# Patient Record
Sex: MALE | URBAN - METROPOLITAN AREA
[De-identification: names, ages, dates, MRNs, and addresses within clinical notes are randomized per-mention and may not be internally consistent; named-entity substitution may affect disease eponyms.]

---

## 2018-07-05 PROBLEM — Z00.00 ENCOUNTER FOR PREVENTIVE HEALTH EXAMINATION: Status: ACTIVE | Noted: 2018-07-05

## 2018-07-12 ENCOUNTER — OUTPATIENT (OUTPATIENT)
Dept: OUTPATIENT SERVICES | Facility: HOSPITAL | Age: 38
LOS: 1 days | Discharge: HOME | End: 2018-07-12
Payer: COMMERCIAL

## 2018-07-12 VITALS
OXYGEN SATURATION: 98 % | HEIGHT: 73 IN | DIASTOLIC BLOOD PRESSURE: 69 MMHG | HEART RATE: 96 BPM | SYSTOLIC BLOOD PRESSURE: 138 MMHG | TEMPERATURE: 97 F | WEIGHT: 315 LBS | RESPIRATION RATE: 20 BRPM

## 2018-07-12 DIAGNOSIS — Z98.890 OTHER SPECIFIED POSTPROCEDURAL STATES: Chronic | ICD-10-CM

## 2018-07-12 DIAGNOSIS — E66.01 MORBID (SEVERE) OBESITY DUE TO EXCESS CALORIES: ICD-10-CM

## 2018-07-12 LAB
ABO RH CONFIRMATION: SIGNIFICANT CHANGE UP
ALBUMIN SERPL ELPH-MCNC: 4.1 G/DL — SIGNIFICANT CHANGE UP (ref 3.3–5)
ANION GAP SERPL CALC-SCNC: 11 MMOL/L — SIGNIFICANT CHANGE UP (ref 5–17)
APPEARANCE UR: CLEAR — SIGNIFICANT CHANGE UP
APTT BLD: 38.7 SEC — HIGH (ref 27.5–37.4)
BASOPHILS # BLD AUTO: 0.05 K/UL — SIGNIFICANT CHANGE UP (ref 0–0.2)
BASOPHILS NFR BLD AUTO: 0.6 % — SIGNIFICANT CHANGE UP (ref 0–2)
BILIRUB UR-MCNC: NEGATIVE — SIGNIFICANT CHANGE UP
BLD GP AB SCN SERPL QL: SIGNIFICANT CHANGE UP
BLOOD GAS SOURCE: SIGNIFICANT CHANGE UP
BUN SERPL-MCNC: 16 MG/DL — SIGNIFICANT CHANGE UP (ref 7–23)
CALCIUM SERPL-MCNC: 9 MG/DL — SIGNIFICANT CHANGE UP (ref 8.5–10.1)
CHLORIDE SERPL-SCNC: 100 MMOL/L — SIGNIFICANT CHANGE UP (ref 96–108)
CO2 SERPL-SCNC: 24 MMOL/L — SIGNIFICANT CHANGE UP (ref 22–31)
COHGB MFR BLDV: 2.7 % — HIGH (ref 0–1.5)
COLOR SPEC: YELLOW — SIGNIFICANT CHANGE UP
CREAT SERPL-MCNC: 0.88 MG/DL — SIGNIFICANT CHANGE UP (ref 0.5–1.3)
DIFF PNL FLD: NEGATIVE — SIGNIFICANT CHANGE UP
EOSINOPHIL # BLD AUTO: 0.11 K/UL — SIGNIFICANT CHANGE UP (ref 0–0.5)
EOSINOPHIL NFR BLD AUTO: 1.2 % — SIGNIFICANT CHANGE UP (ref 0–6)
EPI CELLS # UR: SIGNIFICANT CHANGE UP
GLUCOSE SERPL-MCNC: 119 MG/DL — HIGH (ref 70–99)
GLUCOSE UR QL: NEGATIVE MG/DL — SIGNIFICANT CHANGE UP
HCT VFR BLD CALC: 45.7 % — SIGNIFICANT CHANGE UP (ref 39–50)
HGB BLD-MCNC: 15.7 G/DL — SIGNIFICANT CHANGE UP (ref 13–17)
HYALINE CASTS # UR AUTO: ABNORMAL /LPF
IMM GRANULOCYTES NFR BLD AUTO: 0.4 % — SIGNIFICANT CHANGE UP (ref 0–1.5)
INR BLD: 1.16 RATIO — SIGNIFICANT CHANGE UP (ref 0.88–1.16)
KETONES UR-MCNC: ABNORMAL
LEUKOCYTE ESTERASE UR-ACNC: NEGATIVE — SIGNIFICANT CHANGE UP
LYMPHOCYTES # BLD AUTO: 2.28 K/UL — SIGNIFICANT CHANGE UP (ref 1–3.3)
LYMPHOCYTES # BLD AUTO: 25.4 % — SIGNIFICANT CHANGE UP (ref 13–44)
MCHC RBC-ENTMCNC: 30.4 PG — SIGNIFICANT CHANGE UP (ref 27–34)
MCHC RBC-ENTMCNC: 34.4 GM/DL — SIGNIFICANT CHANGE UP (ref 32–36)
MCV RBC AUTO: 88.6 FL — SIGNIFICANT CHANGE UP (ref 80–100)
MONOCYTES # BLD AUTO: 0.67 K/UL — SIGNIFICANT CHANGE UP (ref 0–0.9)
MONOCYTES NFR BLD AUTO: 7.5 % — SIGNIFICANT CHANGE UP (ref 2–14)
NEUTROPHILS # BLD AUTO: 5.84 K/UL — SIGNIFICANT CHANGE UP (ref 1.8–7.4)
NEUTROPHILS NFR BLD AUTO: 64.9 % — SIGNIFICANT CHANGE UP (ref 43–77)
NITRITE UR-MCNC: NEGATIVE — SIGNIFICANT CHANGE UP
NRBC # BLD: 0 /100 WBCS — SIGNIFICANT CHANGE UP (ref 0–0)
PH UR: 5 — SIGNIFICANT CHANGE UP (ref 5–8)
PLATELET # BLD AUTO: 243 K/UL — SIGNIFICANT CHANGE UP (ref 150–400)
POTASSIUM SERPL-MCNC: 3.8 MMOL/L — SIGNIFICANT CHANGE UP (ref 3.5–5.3)
POTASSIUM SERPL-SCNC: 3.8 MMOL/L — SIGNIFICANT CHANGE UP (ref 3.5–5.3)
PROT UR-MCNC: 15 MG/DL
PROTHROM AB SERPL-ACNC: 12.6 SEC — SIGNIFICANT CHANGE UP (ref 9.8–12.7)
RBC # BLD: 5.16 M/UL — SIGNIFICANT CHANGE UP (ref 4.2–5.8)
RBC # FLD: 12.5 % — SIGNIFICANT CHANGE UP (ref 10.3–14.5)
RBC CASTS # UR COMP ASSIST: NEGATIVE /HPF — SIGNIFICANT CHANGE UP (ref 0–4)
SODIUM SERPL-SCNC: 135 MMOL/L — SIGNIFICANT CHANGE UP (ref 135–145)
SP GR SPEC: 1.01 — SIGNIFICANT CHANGE UP (ref 1.01–1.02)
TYPE + AB SCN PNL BLD: SIGNIFICANT CHANGE UP
UROBILINOGEN FLD QL: NEGATIVE MG/DL — SIGNIFICANT CHANGE UP
WBC # BLD: 8.99 K/UL — SIGNIFICANT CHANGE UP (ref 3.8–10.5)
WBC # FLD AUTO: 8.99 K/UL — SIGNIFICANT CHANGE UP (ref 3.8–10.5)
WBC UR QL: NEGATIVE — SIGNIFICANT CHANGE UP

## 2018-07-12 PROCEDURE — 71046 X-RAY EXAM CHEST 2 VIEWS: CPT | Mod: 26

## 2018-07-12 NOTE — H&P PST ADULT - ASSESSMENT
Plan  1. Stop all NSAIDS, herbal supplements and vitamins for 7 days.  2. NPO at midnight.  3. Take the following medications ( losartan ) with small sips of water on the morning of your procedure/surgery.  4. Use EZ sponges as directed    CAPRINI SCORE    AGE RELATED RISK FACTORS                                                       MOBILITY RELATED FACTORS  [ ] Age 41-60 years                                            (1 Point)                  [ ] Bed rest                                                        (1 Point)  [ ] Age: 61-74 years                                           (2 Points)                [ ] Plaster cast                                                   (2 Points)  [ ] Age= 75 years                                              (3 Points)                 [ ] Bed bound for more than 72 hours                   (2 Points)    DISEASE RELATED RISK FACTORS                                               GENDER SPECIFIC FACTORS  [ ] Edema in the lower extremities                       (1 Point)                  [ ] Pregnancy                                                     (1 Point)  [ ] Varicose veins                                               (1 Point)                  [ ] Post-partum < 6 weeks                                   (1 Point)             [ ] BMI > 25 Kg/m2                                            (1 Point)                  [ ] Hormonal therapy  or oral contraception            (1 Point)                 [ ] Sepsis (in the previous month)                        (1 Point)                  [ ] History of pregnancy complications  [ ] Pneumonia or serious lung disease                                               [ ] Unexplained or recurrent                       (1 Point)           (in the previous month)                               (1 Point)  [ ] Abnormal pulmonary function test                     (1 Point)                 SURGERY RELATED RISK FACTORS  [ ] Acute myocardial infarction                              (1 Point)                 [ ]  Section                                            (1 Point)  [ ] Congestive heart failure (in the previous month)  (1 Point)                 [ ] Minor surgery                                                 (1 Point)   [ ] Inflammatory bowel disease                             (1 Point)                 [ ] Arthroscopic surgery                                        (2 Points)  [ ] Central venous access                                    (2 Points)                [ ] General surgery lasting more than 45 minutes   (2 Points)       [ ] Stroke (in the previous month)                          (5 Points)               [ ] Elective arthroplasty                                        (5 Points)                                                                                                                                               HEMATOLOGY RELATED FACTORS                                                 TRAUMA RELATED RISK FACTORS  [ ] Prior episodes of VTE                                     (3 Points)                 [ ] Fracture of the hip, pelvis, or leg                       (5 Points)  [ ] Positive family history for VTE                         (3 Points)                 [ ] Acute spinal cord injury (in the previous month)  (5 Points)  [ ] Prothrombin 59874 A                                      (3 Points)                 [ ] Paralysis  (less than 1 month)                          (5 Points)  [ ] Factor V Leiden                                             (3 Points)                 [ ] Multiple Trauma within 1 month                         (5 Points)  [ ] Lupus anticoagulants                                     (3 Points)                                                           [ ] Anticardiolipin antibodies                                (3 Points)                                                       [ ] High homocysteine in the blood                      (3 Points)                                             [ ] Other congenital or acquired thrombophilia       (3 Points)                                                [ ] Heparin induced thrombocytopenia                  (3 Points)                                          Total Score [          ] 37 y/o male with morbid obesity, DM, HTN. Pt tried diet and exercise for weight loss without success. Scheduled for sleeve gastrectomy, liver biopsy and intra operative endoscopy with Dr. Day.    Plan  1. Stop all NSAIDS, herbal supplements and vitamins for 7 days.  2. NPO at midnight.  3. Take the following medications ( losartan ) with small sips of water on the morning of your procedure/surgery.  4. Use EZ sponges as directed    CAPRINI SCORE    AGE RELATED RISK FACTORS                                                       MOBILITY RELATED FACTORS  [ ] Age 41-60 years                                            (1 Point)                  [ ] Bed rest                                                        (1 Point)  [ ] Age: 61-74 years                                           (2 Points)                [ ] Plaster cast                                                   (2 Points)  [ ] Age= 75 years                                              (3 Points)                 [ ] Bed bound for more than 72 hours                   (2 Points)    DISEASE RELATED RISK FACTORS                                               GENDER SPECIFIC FACTORS  [ ] Edema in the lower extremities                       (1 Point)                  [ ] Pregnancy                                                     (1 Point)  [ ] Varicose veins                                               (1 Point)                  [ ] Post-partum < 6 weeks                                   (1 Point)             [x ] BMI > 25 Kg/m2                                            (1 Point)                  [ ] Hormonal therapy  or oral contraception            (1 Point)                 [ ] Sepsis (in the previous month)                        (1 Point)                  [ ] History of pregnancy complications  [ ] Pneumonia or serious lung disease                                               [ ] Unexplained or recurrent                       (1 Point)           (in the previous month)                               (1 Point)  [ ] Abnormal pulmonary function test                     (1 Point)                 SURGERY RELATED RISK FACTORS  [ ] Acute myocardial infarction                              (1 Point)                 [ ]  Section                                            (1 Point)  [ ] Congestive heart failure (in the previous month)  (1 Point)                 [ ] Minor surgery                                                 (1 Point)   [ ] Inflammatory bowel disease                             (1 Point)                 [ ] Arthroscopic surgery                                        (2 Points)  [ ] Central venous access                                    (2 Points)                [x ] General surgery lasting more than 45 minutes   (2 Points)       [ ] Stroke (in the previous month)                          (5 Points)               [ ] Elective arthroplasty                                        (5 Points)                                                                                                                                               HEMATOLOGY RELATED FACTORS                                                 TRAUMA RELATED RISK FACTORS  [ ] Prior episodes of VTE                                     (3 Points)                 [ ] Fracture of the hip, pelvis, or leg                       (5 Points)  [ ] Positive family history for VTE                         (3 Points)                 [ ] Acute spinal cord injury (in the previous month)  (5 Points)  [ ] Prothrombin 17092 A                                      (3 Points)                 [ ] Paralysis  (less than 1 month)                          (5 Points)  [ ] Factor V Leiden                                             (3 Points)                 [ ] Multiple Trauma within 1 month                         (5 Points)  [ ] Lupus anticoagulants                                     (3 Points)                                                           [ ] Anticardiolipin antibodies                                (3 Points)                                                       [ ] High homocysteine in the blood                      (3 Points)                                             [ ] Other congenital or acquired thrombophilia       (3 Points)                                                [ ] Heparin induced thrombocytopenia                  (3 Points)                                          Total Score [     3     ]

## 2018-07-12 NOTE — H&P PST ADULT - FAMILY HISTORY
Grandparent  Still living? Unknown  Family history of colon cancer, Age at diagnosis: Age Unknown     Father  Still living? Unknown  Family history of melanoma, Age at diagnosis: Age Unknown     Mother  Still living? Unknown  Family history of melanoma, Age at diagnosis: Age Unknown  Family history of diabetes mellitus in mother, Age at diagnosis: Age Unknown

## 2018-07-12 NOTE — H&P PST ADULT - HISTORY OF PRESENT ILLNESS
39 y/o male with morbid obesity. Pt 37 y/o male with morbid obesity, DM, HTN. Pt tried diet and exercise for weight loss without success. Here today for PST prior to sleeve gastrectomy.

## 2018-07-12 NOTE — H&P PST ADULT - PMH
Diabetes mellitus    GERD (gastroesophageal reflux disease)    Gout    HTN (hypertension)    Hypercholesterolemia    Morbid obesity

## 2018-07-12 NOTE — H&P PST ADULT - PSH
H/O arthroscopy of left knee  11/2007  History of esophagogastroduodenoscopy (EGD)  05/2018  S/P LASIK (laser assisted in situ keratomileusis) of both eyes

## 2018-07-17 ENCOUNTER — APPOINTMENT (OUTPATIENT)
Dept: INTERNAL MEDICINE | Facility: CLINIC | Age: 38
End: 2018-07-17

## 2018-07-19 ENCOUNTER — APPOINTMENT (OUTPATIENT)
Dept: INTERNAL MEDICINE | Facility: CLINIC | Age: 38
End: 2018-07-19

## 2018-07-20 RX ORDER — NALOXONE HYDROCHLORIDE 4 MG/.1ML
0.1 SPRAY NASAL
Qty: 0 | Refills: 0 | Status: DISCONTINUED | OUTPATIENT
Start: 2018-07-23 | End: 2018-07-24

## 2018-07-20 RX ORDER — ONDANSETRON 8 MG/1
4 TABLET, FILM COATED ORAL EVERY 6 HOURS
Qty: 0 | Refills: 0 | Status: DISCONTINUED | OUTPATIENT
Start: 2018-07-23 | End: 2018-07-24

## 2018-07-20 RX ORDER — OXYCODONE HYDROCHLORIDE 5 MG/1
10 TABLET ORAL ONCE
Qty: 0 | Refills: 0 | Status: DISCONTINUED | OUTPATIENT
Start: 2018-07-23 | End: 2018-07-23

## 2018-07-20 RX ORDER — HYDROMORPHONE HYDROCHLORIDE 2 MG/ML
0.5 INJECTION INTRAMUSCULAR; INTRAVENOUS; SUBCUTANEOUS
Qty: 0 | Refills: 0 | Status: DISCONTINUED | OUTPATIENT
Start: 2018-07-23 | End: 2018-07-24

## 2018-07-20 RX ORDER — HYDROMORPHONE HYDROCHLORIDE 2 MG/ML
30 INJECTION INTRAMUSCULAR; INTRAVENOUS; SUBCUTANEOUS
Qty: 0 | Refills: 0 | Status: DISCONTINUED | OUTPATIENT
Start: 2018-07-23 | End: 2018-07-24

## 2018-07-20 RX ORDER — SODIUM CHLORIDE 9 MG/ML
1000 INJECTION, SOLUTION INTRAVENOUS
Qty: 0 | Refills: 0 | Status: DISCONTINUED | OUTPATIENT
Start: 2018-07-23 | End: 2018-07-23

## 2018-07-20 RX ORDER — APREPITANT 80 MG/1
40 CAPSULE ORAL ONCE
Qty: 0 | Refills: 0 | Status: COMPLETED | OUTPATIENT
Start: 2018-07-23 | End: 2018-07-23

## 2018-07-23 ENCOUNTER — INPATIENT (INPATIENT)
Facility: HOSPITAL | Age: 38
LOS: 0 days | Discharge: HOME | End: 2018-07-24
Attending: SURGERY | Admitting: SURGERY
Payer: COMMERCIAL

## 2018-07-23 ENCOUNTER — RESULT REVIEW (OUTPATIENT)
Age: 38
End: 2018-07-23

## 2018-07-23 VITALS
OXYGEN SATURATION: 97 % | WEIGHT: 315 LBS | TEMPERATURE: 98 F | HEART RATE: 76 BPM | DIASTOLIC BLOOD PRESSURE: 62 MMHG | RESPIRATION RATE: 16 BRPM | SYSTOLIC BLOOD PRESSURE: 118 MMHG | HEIGHT: 73 IN

## 2018-07-23 DIAGNOSIS — Z98.890 OTHER SPECIFIED POSTPROCEDURAL STATES: Chronic | ICD-10-CM

## 2018-07-23 LAB
ANION GAP SERPL CALC-SCNC: 11 MMOL/L — SIGNIFICANT CHANGE UP (ref 5–17)
BASOPHILS # BLD AUTO: 0.02 K/UL — SIGNIFICANT CHANGE UP (ref 0–0.2)
BASOPHILS NFR BLD AUTO: 0.2 % — SIGNIFICANT CHANGE UP (ref 0–2)
BUN SERPL-MCNC: 10 MG/DL — SIGNIFICANT CHANGE UP (ref 7–23)
CALCIUM SERPL-MCNC: 8.3 MG/DL — LOW (ref 8.5–10.1)
CHLORIDE SERPL-SCNC: 108 MMOL/L — SIGNIFICANT CHANGE UP (ref 96–108)
CO2 SERPL-SCNC: 21 MMOL/L — LOW (ref 22–31)
CREAT SERPL-MCNC: 1.03 MG/DL — SIGNIFICANT CHANGE UP (ref 0.5–1.3)
EOSINOPHIL # BLD AUTO: 0.04 K/UL — SIGNIFICANT CHANGE UP (ref 0–0.5)
EOSINOPHIL NFR BLD AUTO: 0.4 % — SIGNIFICANT CHANGE UP (ref 0–6)
GLUCOSE BLDC GLUCOMTR-MCNC: 134 MG/DL — HIGH (ref 70–99)
GLUCOSE SERPL-MCNC: 130 MG/DL — HIGH (ref 70–99)
HCT VFR BLD CALC: 42.2 % — SIGNIFICANT CHANGE UP (ref 39–50)
HGB BLD-MCNC: 14 G/DL — SIGNIFICANT CHANGE UP (ref 13–17)
IMM GRANULOCYTES NFR BLD AUTO: 0.5 % — SIGNIFICANT CHANGE UP (ref 0–1.5)
LYMPHOCYTES # BLD AUTO: 1.47 K/UL — SIGNIFICANT CHANGE UP (ref 1–3.3)
LYMPHOCYTES # BLD AUTO: 15.4 % — SIGNIFICANT CHANGE UP (ref 13–44)
MCHC RBC-ENTMCNC: 30.4 PG — SIGNIFICANT CHANGE UP (ref 27–34)
MCHC RBC-ENTMCNC: 33.2 GM/DL — SIGNIFICANT CHANGE UP (ref 32–36)
MCV RBC AUTO: 91.5 FL — SIGNIFICANT CHANGE UP (ref 80–100)
MONOCYTES # BLD AUTO: 0.2 K/UL — SIGNIFICANT CHANGE UP (ref 0–0.9)
MONOCYTES NFR BLD AUTO: 2.1 % — SIGNIFICANT CHANGE UP (ref 2–14)
NEUTROPHILS # BLD AUTO: 7.78 K/UL — HIGH (ref 1.8–7.4)
NEUTROPHILS NFR BLD AUTO: 81.4 % — HIGH (ref 43–77)
NRBC # BLD: 0 /100 WBCS — SIGNIFICANT CHANGE UP (ref 0–0)
PLATELET # BLD AUTO: 183 K/UL — SIGNIFICANT CHANGE UP (ref 150–400)
POTASSIUM SERPL-MCNC: 4.2 MMOL/L — SIGNIFICANT CHANGE UP (ref 3.5–5.3)
POTASSIUM SERPL-SCNC: 4.2 MMOL/L — SIGNIFICANT CHANGE UP (ref 3.5–5.3)
RBC # BLD: 4.61 M/UL — SIGNIFICANT CHANGE UP (ref 4.2–5.8)
RBC # FLD: 12.7 % — SIGNIFICANT CHANGE UP (ref 10.3–14.5)
SODIUM SERPL-SCNC: 140 MMOL/L — SIGNIFICANT CHANGE UP (ref 135–145)
WBC # BLD: 9.56 K/UL — SIGNIFICANT CHANGE UP (ref 3.8–10.5)
WBC # FLD AUTO: 9.56 K/UL — SIGNIFICANT CHANGE UP (ref 3.8–10.5)

## 2018-07-23 PROCEDURE — 47100 WEDGE BIOPSY OF LIVER: CPT | Mod: AS

## 2018-07-23 PROCEDURE — 88313 SPECIAL STAINS GROUP 2: CPT | Mod: 26

## 2018-07-23 PROCEDURE — 43775 LAP SLEEVE GASTRECTOMY: CPT | Mod: AS

## 2018-07-23 PROCEDURE — 88307 TISSUE EXAM BY PATHOLOGIST: CPT | Mod: 26

## 2018-07-23 PROCEDURE — 88305 TISSUE EXAM BY PATHOLOGIST: CPT | Mod: 26

## 2018-07-23 RX ORDER — GLUCAGON INJECTION, SOLUTION 0.5 MG/.1ML
1 INJECTION, SOLUTION SUBCUTANEOUS ONCE
Qty: 0 | Refills: 0 | Status: DISCONTINUED | OUTPATIENT
Start: 2018-07-23 | End: 2018-07-24

## 2018-07-23 RX ORDER — ONDANSETRON 8 MG/1
4 TABLET, FILM COATED ORAL ONCE
Qty: 0 | Refills: 0 | Status: DISCONTINUED | OUTPATIENT
Start: 2018-07-23 | End: 2018-07-23

## 2018-07-23 RX ORDER — DEXTROSE 50 % IN WATER 50 %
15 SYRINGE (ML) INTRAVENOUS ONCE
Qty: 0 | Refills: 0 | Status: DISCONTINUED | OUTPATIENT
Start: 2018-07-23 | End: 2018-07-24

## 2018-07-23 RX ORDER — DEXTROSE 50 % IN WATER 50 %
25 SYRINGE (ML) INTRAVENOUS ONCE
Qty: 0 | Refills: 0 | Status: DISCONTINUED | OUTPATIENT
Start: 2018-07-23 | End: 2018-07-24

## 2018-07-23 RX ORDER — ROSUVASTATIN CALCIUM 5 MG/1
1 TABLET ORAL
Qty: 0 | Refills: 0 | COMMUNITY

## 2018-07-23 RX ORDER — SODIUM CHLORIDE 9 MG/ML
1000 INJECTION, SOLUTION INTRAVENOUS
Qty: 0 | Refills: 0 | Status: DISCONTINUED | OUTPATIENT
Start: 2018-07-23 | End: 2018-07-24

## 2018-07-23 RX ORDER — ALLOPURINOL 300 MG
1 TABLET ORAL
Qty: 0 | Refills: 0 | COMMUNITY

## 2018-07-23 RX ORDER — ENOXAPARIN SODIUM 100 MG/ML
40 INJECTION SUBCUTANEOUS EVERY 12 HOURS
Qty: 0 | Refills: 0 | Status: DISCONTINUED | OUTPATIENT
Start: 2018-07-23 | End: 2018-07-24

## 2018-07-23 RX ORDER — INSULIN LISPRO 100/ML
VIAL (ML) SUBCUTANEOUS EVERY 6 HOURS
Qty: 0 | Refills: 0 | Status: DISCONTINUED | OUTPATIENT
Start: 2018-07-23 | End: 2018-07-24

## 2018-07-23 RX ORDER — FENTANYL CITRATE 50 UG/ML
50 INJECTION INTRAVENOUS
Qty: 0 | Refills: 0 | Status: DISCONTINUED | OUTPATIENT
Start: 2018-07-23 | End: 2018-07-23

## 2018-07-23 RX ORDER — PANTOPRAZOLE SODIUM 20 MG/1
40 TABLET, DELAYED RELEASE ORAL EVERY 24 HOURS
Qty: 0 | Refills: 0 | Status: DISCONTINUED | OUTPATIENT
Start: 2018-07-23 | End: 2018-07-24

## 2018-07-23 RX ORDER — DEXTROSE 50 % IN WATER 50 %
12.5 SYRINGE (ML) INTRAVENOUS ONCE
Qty: 0 | Refills: 0 | Status: DISCONTINUED | OUTPATIENT
Start: 2018-07-23 | End: 2018-07-24

## 2018-07-23 RX ORDER — LOSARTAN POTASSIUM 100 MG/1
1 TABLET, FILM COATED ORAL
Qty: 0 | Refills: 0 | COMMUNITY

## 2018-07-23 RX ORDER — OXYCODONE HYDROCHLORIDE 5 MG/1
10 TABLET ORAL EVERY 6 HOURS
Qty: 0 | Refills: 0 | Status: DISCONTINUED | OUTPATIENT
Start: 2018-07-23 | End: 2018-07-23

## 2018-07-23 RX ORDER — HEPARIN SODIUM 5000 [USP'U]/ML
5000 INJECTION INTRAVENOUS; SUBCUTANEOUS ONCE
Qty: 0 | Refills: 0 | Status: COMPLETED | OUTPATIENT
Start: 2018-07-23 | End: 2018-07-23

## 2018-07-23 RX ADMIN — SODIUM CHLORIDE 75 MILLILITER(S): 9 INJECTION, SOLUTION INTRAVENOUS at 16:38

## 2018-07-23 RX ADMIN — APREPITANT 40 MILLIGRAM(S): 80 CAPSULE ORAL at 10:37

## 2018-07-23 RX ADMIN — OXYCODONE HYDROCHLORIDE 10 MILLIGRAM(S): 5 TABLET ORAL at 10:38

## 2018-07-23 RX ADMIN — ENOXAPARIN SODIUM 40 MILLIGRAM(S): 100 INJECTION SUBCUTANEOUS at 18:11

## 2018-07-23 RX ADMIN — HYDROMORPHONE HYDROCHLORIDE 30 MILLILITER(S): 2 INJECTION INTRAMUSCULAR; INTRAVENOUS; SUBCUTANEOUS at 13:17

## 2018-07-23 RX ADMIN — PANTOPRAZOLE SODIUM 40 MILLIGRAM(S): 20 TABLET, DELAYED RELEASE ORAL at 13:07

## 2018-07-23 RX ADMIN — OXYCODONE HYDROCHLORIDE 10 MILLIGRAM(S): 5 TABLET ORAL at 10:37

## 2018-07-23 RX ADMIN — HEPARIN SODIUM 5000 UNIT(S): 5000 INJECTION INTRAVENOUS; SUBCUTANEOUS at 10:36

## 2018-07-23 RX ADMIN — SODIUM CHLORIDE 150 MILLILITER(S): 9 INJECTION, SOLUTION INTRAVENOUS at 18:11

## 2018-07-23 RX ADMIN — Medication 2.5 MILLIGRAM(S): at 18:14

## 2018-07-23 NOTE — BRIEF OPERATIVE NOTE - PROCEDURE
<<-----Click on this checkbox to enter Procedure Liver biopsy  07/23/2018    Active  ENASTRO  Endoscopy  07/23/2018    Active  ENASTRO  Gastrectomy, laparoscopic sleeve  07/23/2018    Active  ENASTRO

## 2018-07-24 ENCOUNTER — TRANSCRIPTION ENCOUNTER (OUTPATIENT)
Age: 38
End: 2018-07-24

## 2018-07-24 VITALS
DIASTOLIC BLOOD PRESSURE: 73 MMHG | TEMPERATURE: 97 F | OXYGEN SATURATION: 96 % | SYSTOLIC BLOOD PRESSURE: 126 MMHG | HEART RATE: 65 BPM | RESPIRATION RATE: 18 BRPM

## 2018-07-24 DIAGNOSIS — E66.01 MORBID (SEVERE) OBESITY DUE TO EXCESS CALORIES: ICD-10-CM

## 2018-07-24 LAB
ANION GAP SERPL CALC-SCNC: 9 MMOL/L — SIGNIFICANT CHANGE UP (ref 5–17)
BASOPHILS # BLD AUTO: 0.02 K/UL — SIGNIFICANT CHANGE UP (ref 0–0.2)
BASOPHILS NFR BLD AUTO: 0.2 % — SIGNIFICANT CHANGE UP (ref 0–2)
BUN SERPL-MCNC: 7 MG/DL — SIGNIFICANT CHANGE UP (ref 7–23)
CALCIUM SERPL-MCNC: 8.5 MG/DL — SIGNIFICANT CHANGE UP (ref 8.5–10.1)
CHLORIDE SERPL-SCNC: 105 MMOL/L — SIGNIFICANT CHANGE UP (ref 96–108)
CO2 SERPL-SCNC: 23 MMOL/L — SIGNIFICANT CHANGE UP (ref 22–31)
CREAT SERPL-MCNC: 0.68 MG/DL — SIGNIFICANT CHANGE UP (ref 0.5–1.3)
EOSINOPHIL # BLD AUTO: 0.02 K/UL — SIGNIFICANT CHANGE UP (ref 0–0.5)
EOSINOPHIL NFR BLD AUTO: 0.2 % — SIGNIFICANT CHANGE UP (ref 0–6)
GLUCOSE BLDC GLUCOMTR-MCNC: 102 MG/DL — HIGH (ref 70–99)
GLUCOSE BLDC GLUCOMTR-MCNC: 117 MG/DL — HIGH (ref 70–99)
GLUCOSE BLDC GLUCOMTR-MCNC: 83 MG/DL — SIGNIFICANT CHANGE UP (ref 70–99)
GLUCOSE BLDC GLUCOMTR-MCNC: 97 MG/DL — SIGNIFICANT CHANGE UP (ref 70–99)
GLUCOSE SERPL-MCNC: 80 MG/DL — SIGNIFICANT CHANGE UP (ref 70–99)
HCT VFR BLD CALC: 40 % — SIGNIFICANT CHANGE UP (ref 39–50)
HGB BLD-MCNC: 13.3 G/DL — SIGNIFICANT CHANGE UP (ref 13–17)
IMM GRANULOCYTES NFR BLD AUTO: 0.2 % — SIGNIFICANT CHANGE UP (ref 0–1.5)
LYMPHOCYTES # BLD AUTO: 1.97 K/UL — SIGNIFICANT CHANGE UP (ref 1–3.3)
LYMPHOCYTES # BLD AUTO: 22.4 % — SIGNIFICANT CHANGE UP (ref 13–44)
MCHC RBC-ENTMCNC: 29.6 PG — SIGNIFICANT CHANGE UP (ref 27–34)
MCHC RBC-ENTMCNC: 33.3 GM/DL — SIGNIFICANT CHANGE UP (ref 32–36)
MCV RBC AUTO: 88.9 FL — SIGNIFICANT CHANGE UP (ref 80–100)
MONOCYTES # BLD AUTO: 0.57 K/UL — SIGNIFICANT CHANGE UP (ref 0–0.9)
MONOCYTES NFR BLD AUTO: 6.5 % — SIGNIFICANT CHANGE UP (ref 2–14)
NEUTROPHILS # BLD AUTO: 6.2 K/UL — SIGNIFICANT CHANGE UP (ref 1.8–7.4)
NEUTROPHILS NFR BLD AUTO: 70.5 % — SIGNIFICANT CHANGE UP (ref 43–77)
NRBC # BLD: 0 /100 WBCS — SIGNIFICANT CHANGE UP (ref 0–0)
PLATELET # BLD AUTO: 207 K/UL — SIGNIFICANT CHANGE UP (ref 150–400)
POTASSIUM SERPL-MCNC: 4 MMOL/L — SIGNIFICANT CHANGE UP (ref 3.5–5.3)
POTASSIUM SERPL-SCNC: 4 MMOL/L — SIGNIFICANT CHANGE UP (ref 3.5–5.3)
RBC # BLD: 4.5 M/UL — SIGNIFICANT CHANGE UP (ref 4.2–5.8)
RBC # FLD: 12.8 % — SIGNIFICANT CHANGE UP (ref 10.3–14.5)
SODIUM SERPL-SCNC: 137 MMOL/L — SIGNIFICANT CHANGE UP (ref 135–145)
WBC # BLD: 8.8 K/UL — SIGNIFICANT CHANGE UP (ref 3.8–10.5)
WBC # FLD AUTO: 8.8 K/UL — SIGNIFICANT CHANGE UP (ref 3.8–10.5)

## 2018-07-24 PROCEDURE — 74241: CPT | Mod: 26

## 2018-07-24 RX ORDER — OXYCODONE HYDROCHLORIDE 5 MG/1
10 TABLET ORAL EVERY 4 HOURS
Qty: 0 | Refills: 0 | Status: DISCONTINUED | OUTPATIENT
Start: 2018-07-24 | End: 2018-07-24

## 2018-07-24 RX ORDER — OXYCODONE HYDROCHLORIDE 5 MG/1
5 TABLET ORAL EVERY 4 HOURS
Qty: 0 | Refills: 0 | Status: DISCONTINUED | OUTPATIENT
Start: 2018-07-24 | End: 2018-07-24

## 2018-07-24 RX ORDER — KETOROLAC TROMETHAMINE 30 MG/ML
30 SYRINGE (ML) INJECTION EVERY 6 HOURS
Qty: 0 | Refills: 0 | Status: DISCONTINUED | OUTPATIENT
Start: 2018-07-24 | End: 2018-07-24

## 2018-07-24 RX ORDER — METFORMIN HYDROCHLORIDE 850 MG/1
2 TABLET ORAL
Qty: 0 | Refills: 0 | COMMUNITY

## 2018-07-24 RX ADMIN — Medication 30 MILLIGRAM(S): at 11:25

## 2018-07-24 RX ADMIN — ENOXAPARIN SODIUM 40 MILLIGRAM(S): 100 INJECTION SUBCUTANEOUS at 18:17

## 2018-07-24 RX ADMIN — ENOXAPARIN SODIUM 40 MILLIGRAM(S): 100 INJECTION SUBCUTANEOUS at 06:11

## 2018-07-24 RX ADMIN — SODIUM CHLORIDE 150 MILLILITER(S): 9 INJECTION, SOLUTION INTRAVENOUS at 11:18

## 2018-07-24 RX ADMIN — PANTOPRAZOLE SODIUM 40 MILLIGRAM(S): 20 TABLET, DELAYED RELEASE ORAL at 11:16

## 2018-07-24 RX ADMIN — Medication 2.5 MILLIGRAM(S): at 11:17

## 2018-07-24 RX ADMIN — SODIUM CHLORIDE 150 MILLILITER(S): 9 INJECTION, SOLUTION INTRAVENOUS at 06:15

## 2018-07-24 RX ADMIN — Medication 2.5 MILLIGRAM(S): at 00:45

## 2018-07-24 RX ADMIN — Medication 2.5 MILLIGRAM(S): at 06:12

## 2018-07-24 RX ADMIN — Medication 30 MILLIGRAM(S): at 18:17

## 2018-07-24 RX ADMIN — SODIUM CHLORIDE 150 MILLILITER(S): 9 INJECTION, SOLUTION INTRAVENOUS at 00:44

## 2018-07-24 RX ADMIN — Medication 30 MILLIGRAM(S): at 11:16

## 2018-07-24 NOTE — DISCHARGE NOTE ADULT - PATIENT PORTAL LINK FT
You can access the [facility], offered by [facility], by registering with the following website: [email or web address]

## 2018-07-24 NOTE — DISCHARGE NOTE ADULT - INSTRUCTIONS
Keep dermabond sites clean and dry. Take medications one hour apart from each other. Any medication larger then a pencil eraser crush. Follow up with dr. pruitt in two weeks. If you experience fever, chills, or increase in abdominal pain come back to ED.

## 2018-07-24 NOTE — DISCHARGE NOTE ADULT - MEDICATION SUMMARY - MEDICATIONS TO TAKE
I will START or STAY ON the medications listed below when I get home from the hospital:    losartan 50 mg oral tablet  -- 1 tab(s) by mouth 2 times a day  -- Indication: For Morbid obesity    allopurinol 100 mg oral tablet  -- 1 tab(s) by mouth 2 times a day  -- Indication: For Morbid obesity    rosuvastatin 10 mg oral tablet  -- 1 tab(s) by mouth once a day (at bedtime)  -- Indication: For Morbid obesity

## 2018-07-24 NOTE — DISCHARGE NOTE ADULT - CARE PLAN
Principal Discharge DX:	Morbid obesity  Goal:	Recover from surgery  Assessment and plan of treatment:	Follow the office packet, follow-up in two weeks

## 2018-07-24 NOTE — DISCHARGE NOTE ADULT - MEDICATION SUMMARY - MEDICATIONS TO STOP TAKING
I will STOP taking the medications listed below when I get home from the hospital:    metFORMIN 500 mg oral tablet  -- 2 tab(s) by mouth 2 times a day    glipiZIDE 5 mg oral tablet  -- 1 tab(s) by mouth 2 times a day

## 2018-07-24 NOTE — PROGRESS NOTE ADULT - SUBJECTIVE AND OBJECTIVE BOX
Post Op Day#: 1  Procedure:  Laparoscopic Sleeve Gastrectomy    The patient is doing well without complaints.    Vital Signs Last 24 Hrs  T(C): 36.4 (24 Jul 2018 04:46), Max: 37.2 (24 Jul 2018 00:34)  T(F): 97.6 (24 Jul 2018 04:46), Max: 98.9 (24 Jul 2018 00:34)  HR: 64 (24 Jul 2018 04:46) (64 - 90)  BP: 139/65 (24 Jul 2018 04:46) (102/45 - 139/65)  BP(mean): --  RR: 18 (24 Jul 2018 04:46) (12 - 18)  SpO2: 99% (24 Jul 2018 04:46) (95% - 100%)    PHYSICAL EXAM:  General: NAD.  HEENT: no JVD, no jaundice.  LUNGS: CTAB.  Heart: S1 S2 RRR  Abd: soft nt/nd   Wounds: clean dry and intact                          13.3   8.80  )-----------( 207      ( 24 Jul 2018 07:03 )             40.0       07-24    137  |  105  |  7   ----------------------------<  80  4.0   |  23  |  0.68    Ca    8.5      24 Jul 2018 07:03

## 2018-07-27 DIAGNOSIS — M10.9 GOUT, UNSPECIFIED: ICD-10-CM

## 2018-07-27 DIAGNOSIS — K76.0 FATTY (CHANGE OF) LIVER, NOT ELSEWHERE CLASSIFIED: ICD-10-CM

## 2018-07-27 DIAGNOSIS — E78.2 MIXED HYPERLIPIDEMIA: ICD-10-CM

## 2018-07-27 DIAGNOSIS — E66.01 MORBID (SEVERE) OBESITY DUE TO EXCESS CALORIES: ICD-10-CM

## 2018-07-27 LAB — SURGICAL PATHOLOGY FINAL REPORT - CH: SIGNIFICANT CHANGE UP

## 2018-08-29 ENCOUNTER — EMERGENCY (EMERGENCY)
Facility: HOSPITAL | Age: 38
LOS: 1 days | Discharge: HOME | End: 2018-08-29
Admitting: EMERGENCY MEDICINE
Payer: OTHER MISCELLANEOUS

## 2018-08-29 VITALS
OXYGEN SATURATION: 100 % | RESPIRATION RATE: 16 BRPM | TEMPERATURE: 99 F | SYSTOLIC BLOOD PRESSURE: 157 MMHG | HEART RATE: 84 BPM | DIASTOLIC BLOOD PRESSURE: 91 MMHG
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
## 2018-08-29 DIAGNOSIS — E78.00 PURE HYPERCHOLESTEROLEMIA, UNSPECIFIED: ICD-10-CM

## 2018-08-29 DIAGNOSIS — Z98.890 OTHER SPECIFIED POSTPROCEDURAL STATES: Chronic | ICD-10-CM

## 2018-08-29 DIAGNOSIS — E11.9 TYPE 2 DIABETES MELLITUS WITHOUT COMPLICATIONS: ICD-10-CM
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
## 2018-08-29 DIAGNOSIS — I10 ESSENTIAL (PRIMARY) HYPERTENSION: ICD-10-CM
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
## 2018-08-29 DIAGNOSIS — M25.511 PAIN IN RIGHT SHOULDER: ICD-10-CM
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
## 2018-08-29 DIAGNOSIS — G89.11 ACUTE PAIN DUE TO TRAUMA: ICD-10-CM

## 2018-08-29 DIAGNOSIS — M54.2 CERVICALGIA: ICD-10-CM

## 2018-08-29 PROCEDURE — 99284 EMERGENCY DEPT VISIT MOD MDM: CPT

## 2018-08-29 NOTE — ED ADULT TRIAGE NOTE - CCCP TRG CHIEF CMPLNT
Acute Care - Occupational Therapy Treatment Note  [facility]     Patient Name: Valarie Camara  : 1942  MRN: [id number]  Today's Date: 3/6/2018  Onset of Illness/Injury or Date of Surgery Date: 18  Date of Referral to OT: 18  Referring Physician: Dr Crespo      Admit Date: 3/3/2018    Visit Dx:     ICD-10-CM ICD-9-CM   1. Dysarthria R47.1 784.51   2. Impaired mobility and ADLs Z74.09 799.89   3. Impaired functional mobility, balance, gait, and endurance Z74.09 V49.89   4. Lacunar stroke I63.9 434.91     Patient Active Problem List   Diagnosis   • Atopic rhinitis   • Arthritis   • Benign paroxysmal positional vertigo   • Neck pain   • Anxiety and depression   • DM (diabetes mellitus)   • Edema   • Gastroesophageal reflux disease with esophagitis   • Multiple-type hyperlipidemia   • Vitamin D deficiency   • Benign essential hypertension   • Obesity (BMI 30-39.9)   • Unstable angina   • History of COPD   • Hyperlipidemia   • History of cataract   • History of osteoporosis   • History of restless legs syndrome   • History of rheumatoid arthritis   • Elevated serum creatinine   • Heartburn   • Pharyngoesophageal dysphagia   • Constipation   • Schatzki's ring   • Gastritis without bleeding   • Helicobacter pylori infection   • Duodenitis   • Dysarthria   • Right hemiparesis   • HTN (hypertension)   • Stroke   • Multiple thyroid nodules   • Lacunar stroke   • DM (diabetes mellitus), type 2, uncontrolled w/neurologic complication             Adult Rehabilitation Note       18 1325 18 1238 18 0925    Rehab Assessment/Intervention    Discipline physical therapist  -CD (P)  occupational therapist  -SS speech language pathologist  -LS    Document Type therapy note (daily note)  -CD (P)  therapy note (daily note)  -SS     Subjective Information no complaints  -CD (P)  no complaints;agree to therapy  -SS no complaints;agree to therapy  -LS    Patient Effort, Rehab Treatment good  -CD (P)   good  -SS     Symptoms Noted During/After Treatment significant change in vital signs   /91 AFTER GAIT X 350 FEET. NSG NOTIFIED AND TO RECHECK  -CD (P)  none  -SS     Precautions/Limitations fall precautions  -CD (P)  fall precautions  -SS     Patient Response to Treatment  (P)  Tolerated.  -SS     Recorded by [CD] Gracy Millan, PT [SS] Rex Enamorado, OT Student [LS] Lizeth Jenkins, MS CCC-SLP    Vital Signs    Pre Systolic BP Rehab 176  -CD (P)  --   Vitals stable during treatment. RN approved treatment.  -SS     Pre Treatment Diastolic BP 98  -CD      Post Systolic BP Rehab 225  -CD (P)  176  -SS     Post Treatment Diastolic BP 91  -CD (P)  98  -SS     Pretreatment Heart Rate (beats/min) 83  -CD      Posttreatment Heart Rate (beats/min) 79  -CD      Posttreatment Resp Rate (breaths/min)  (P)  83  -SS     Recorded by [CD] Gracy Millan, PT [SS] Rex Enamorado, OT Student     Pain Assessment    Pain Assessment 0-10  -CD (P)  0-10  -SS No/denies pain  -LS    [place] FACES Pain Rating  (P)  2  -SS     Pain Score 2  -CD (P)  7  -SS     Post Pain Score 2  -CD (P)  7  -SS     Pain Type Acute pain  -CD (P)  Acute pain  -SS     Pain Location Head  -CD (P)  Neck  -SS     Pain Intervention(s) Ambulation/increased activity  -CD (P)  Ambulation/increased activity;Repositioned  -SS     Response to Interventions TOLERATED.   -CD (P)  Tolerated  -SS     Recorded by [CD] Gracy Millan, PT [SS] Rex Enamorado, OT Student [LS] Lizeth Jenkins, MS CCC-SLP    Cognitive Assessment/Intervention    Current Cognitive/Communication Assessment functional  -CD (P)  functional  -SS     Orientation Status oriented x 4  -CD (P)  oriented x 4  -SS     Follows Commands/Answers Questions 100% of the time  -CD (P)  100% of the time;able to follow single-step instructions  -SS     Personal Safety WNL/WFL  -CD (P)  mild impairment;decreased awareness, need for safety;decreased insight to deficits;decreased awareness,  shoulder pain/injury need for assist  -SS     Personal Safety Interventions gait belt;fall prevention program maintained;muscle strengthening facilitated;nonskid shoes/slippers when out of bed;supervised activity  -CD (P)  gait belt;muscle strengthening facilitated;fall prevention program maintained;supervised activity  -SS     Recorded by [CD] Gracy Millan, PT [SS] Rex Enamorado, OT Student     Improve word retrieval skills    Improve word retrieval skills by:   completing functional word finding tasks;completing a divergent task;90%;without cues  -LS    Status: Improve word retrieval skills   Progressing as expected  -LS    Word Retrieval Skills Progress   50%;60%;with inconsistent cues  -LS    Comments: Improve word retrieval skills   50% divergent tasks,   -LS    Recorded by   [LS] Lizeth Jenkins MS CCC-SLP    Improve articulation    Improve articulation:   by over-articulating at word level;by over-articulating at phrase level;by over-articulating in connected speech  -LS    Status: Improve articulation   Progressing as expected  -LS    Articulation Progress   80%;without cues  -LS    Comments: Improve articulation   Pt intelligable in word and conversation level.   -LS    Recorded by   [LS] Lizeth Jenkins MS CCC-SLP    Bed Mobility, Assessment/Treatment    Bed Mobility, Comment PT UIC AND RETURNED TO CHAIR.   -CD (P)  Pt UIC on arrival  -SS     Recorded by [CD] Gracy Millan, PT [SS] Rex Enamorado, OT Student     Transfer Assessment/Treatment    Transfers, Sit-Stand [place] supervision required  -CD (P)  supervision required  -SS     Transfers, Stand-Sit [place] supervision required  -CD (P)  supervision required  -SS     Transfers, Sit-Stand-Sit, Assist Device rolling walker  -CD      Toilet Transfer, [place] supervision required  -CD (P)  supervision required  -SS     Toilet Transfer, Assistive Device elevated toilet seat;rolling walker  -CD (P)  elevated toilet seat  -SS     Transfer, Safety  Issues balance decreased during turns;step length decreased  -CD (P)  step length decreased;balance decreased during turns  -SS     Transfer, Impairments impaired balance  -CD (P)  impaired balance  -SS     Transfer, Comment  (P)  Cues for hand placement during transfers.  -SS     Recorded by [CD] Gracy Millan, PT [SS] Rex Enamorado, OT Student     Gait Assessment/Treatment    Gait, [place] Level contact guard assist  -CD      Gait, Assistive Device rolling walker  -CD      Gait, Distance (Feet) 350   100 FEET WITH STRAIGHT CANE AND CGA.   -CD      Gait, Gait Deviations tyra decreased;step length decreased   VEERS R.   -CD      Gait, Safety Issues balance decreased during turns  -CD      Gait, Impairments impaired balance  -CD      Gait, Comment QUALITY OF GAIT BETTER WITH R WALKER VS CANE. RECOMMEND R WALKER AT D/C WITH PROGRESSION TO CANE.   -CD      Recorded by [CD] Gracy Mlilan, PT      Functional Mobility    Functional Mobility- Ind. Level  (P)  contact guard assist  -SS     Functional Mobility-Distance (Feet)  (P)  20  -SS     Functional Mobility- Safety Issues  (P)  step length decreased;balance decreased during turns  -     Functional Mobility- Comment  (P)  Functional mobility to bathroom and back to chair. Moments of unsteadiness requiring CGA. No RW used for functional mobility per pt request.  -SS     Recorded by  [SS] Rex Enamorado, OT Student     Lower Body Dressing Assessment/Training    LB Dressing Assess/Train, Clothing Type  (P)  donning:;socks  -     LB Dressing Assess/Train, Position  (P)  sitting  -     LB Dressing Assess/Train, [place]  (P)  conditional independence  -     LB Dressing Assess/Train, Comment  (P)  Pt able to safely don socks from recliner  -     Recorded by  [SS] Rex Enamorado, OT Student     Motor Skills/Interventions    Additional Documentation Balance Skills Training (Group)  -CD (P)  Balance Skills Training (Group)  -      Recorded by [CD] Gracy Millan, PT [SS] Rex Enamorado, OT Student     Balance Skills Training    Sitting-Level of Assistance Independent  -CD (P)  Independent  -SS     Sitting-Balance Support  (P)  Feet supported  -SS     Sitting-Balance Activities  (P)  Forward lean;Right UE Weight Bearing;Left UE Weight Bearing  -SS     Sitting # of Minutes  (P)  10  -SS     Standing-Level of Assistance Close supervision  -CD (P)  Contact guard  -SS     Static Standing Balance Support  (P)  No upper extremity supported  -SS     Standing-Balance Activities  (P)  Weight Shift A-P;Weight Shift R-L;Forward lean  -SS     Standing Balance # of Minutes  (P)  3  -SS     Gait Balance-Level of Assistance Contact guard  -CD      Gait Balance Support assistive device  -CD      Recorded by [CD] Gracy Millan, PT [SS] Rex Enamorado, OT Student     Therapy Exercises    Bilateral Lower Extremities AROM:;10 reps;sitting;ankle pumps/circles;hip flexion;LAQ  -CD      Bilateral Upper Extremity  (P)  AROM:;10 reps;elbow flexion/extension;shoulder extension/flexion;shoulder rolls/shrugs;shoulder protraction/retraction;hand pumps  -SS     Recorded by [CD] Gracy Millan, PT [SS] Rex Enamorado, OT Student     Gross Motor Coordination Training    Gross Motor Skill, Impairments Detail  (P)  alternating movements with both hands. finger to nose w/ eyes open/closed.  -SS     Recorded by  [SS] Rex Enamorado, OT Student     Fine Motor Coordination Training    Opposition  (P)  --   Pt demonstarted use of theraputy to facilitate fine motor  -SS     Recorded by  [SS] Rex Enamorado, OT Student     Positioning and Restraints    Pre-Treatment Position sitting in chair/recliner  -CD (P)  sitting in chair/recliner  -SS     Post Treatment Position chair  -CD (P)  chair  -SS     In Chair reclined;call light within reach;with family/caregiver;notified nsg;legs elevated;exit alarm on;encouraged to call for assist  -CD (P)  call light  within reach;encouraged to call for assist;exit alarm on;legs elevated  -SS     Recorded by [CD] Gracy Millan, PT [SS] Rex Enamorado OT Student       03/05/18 1415 03/05/18 1050 03/05/18 0901    Rehab Assessment/Intervention    Discipline speech language pathologist  -RD physical therapist  -SC --  -AN    Document Type therapy note (daily note)  -RD therapy note (daily note)  -SC --  -AN    Subjective Information no complaints;agree to therapy  -RD complains of;fatigue  -SC --  -AN    Patient Effort, Rehab Treatment good  -RD good  -SC --  -AN    Symptoms Noted During/After Treatment  significant change in vital signs;fatigue   elevated bp   -SC --  -AN    Precautions/Limitations  fall precautions  -SC --  -AN    Specific Treatment Considerations   --  -AN    Recorded by [RD] Jodi Lynne, MS CCC-SLP [SC] Frederick Hwang, PT [AN] Aniya Harden, OT    Vital Signs    Post Systolic BP Rehab  177  -SC     Post Treatment Diastolic BP  105  -SC     Recorded by  [SC] Frederick Hwang, PT     Pain Assessment    Pain Assessment No/denies pain  -RD No/denies pain  -SC --  -AN    Pain Score  0  -SC     Recorded by [RD] Jodi Lynne, MS CCC-SLP [SC] Frederick Hwang, PT [AN] Aniya Harden, OT    Cognitive Assessment/Intervention    Current Cognitive/Communication Assessment  functional  -SC --  -AN    Orientation Status  oriented x 4  -SC --  -AN    Follows Commands/Answers Questions  100% of the time  -SC --  -AN    Personal Safety  WNL/WFL  -SC --  -AN    Personal Safety Interventions  fall prevention program maintained;gait belt  -SC     Recorded by  [SC] Frederick Hwang, PT [AN] Aniya Harden, OT    Cognitive Assessment Intervention    Behavior/Mood Observations  alert;cooperative  -SC     Recorded by  [SC] Frederick Hwang, PT     Verbal Expression Assess/Intervention    Automatic Speech WNL/WFL  -RD      Speech Repetition WNL/WFL  -RD      Speech Fluency fluent speech  -RD      Conversational Speech mild  impairment;other (see comments)   occasional word-finding difficulty  -RD      Recorded by [RD] Jodi Lynne MS CCC-SLP      Reading Assessment/Intervention    Reading Skills WNL/WFL  -RD      Oral Reading Ability WNL/WFL  -RD      Reading Comprehension WNL/WFL  -RD      Recorded by [GEORGETTE] Jodi Lynne MS CCC-SLP      Writing Assessment/Intervention    Writing Skills WNL/WFL  -RD      Recorded by [RD] Jodi Lynne MS CCC-SLP      Improve word retrieval skills    Improve word retrieval skills by: completing functional word finding tasks;completing a divergent task;90%;without cues  -RD      Status: Improve word retrieval skills New  -RD      Word Retrieval Skills Progress continue to address  -RD      Comments: Improve word retrieval skills 50%-divergent taks; reports occasional word-finding difficulty in conversation.  -RD      Recorded by [RD] Jdoi Lynne MS CCC-SLP      Improve articulation    Improve articulation: by over-articulating at word level;by over-articulating at phrase level;by over-articulating in connected speech  -RD      Status: Improve articulation Progressing as expected  -RD      Articulation Progress 70%;with inconsistent cues;continue to address  -RD      Recorded by [RD] Jodi Lynne MS CCC-SLP      Bed Mobility, Assessment/Treatment    Bed Mobility, Comment  uic  -SC     Recorded by  [SC] Frederick Hwang, PT     Transfer Assessment/Treatment    Transfers, Sit-Stand [place]  verbal cues required;supervision required  -SC     Transfers, Stand-Sit [place]  supervision required;verbal cues required  -SC     Transfers, Sit-Stand-Sit, Assist Device  rolling walker  -SC     Transfer, Safety Issues  balance decreased during turns  -SC     Transfer, Impairments  coordination impaired;impaired balance  -SC     Transfer, Comment  required some cues for safety when sitting down  -SC     Recorded by  [SC] Frederick Hwang, PT     Gait Assessment/Treatment    Gait,  [place] Level  contact guard assist  -SC     Gait, Assistive Device  rolling walker  -SC     Gait, Distance (Feet)  350   100 feet with hand held assist  -SC     Gait, Gait Pattern Analysis  swing-through gait   x3 standing breaks  -SC     Gait, Gait Deviations  other (see comments)   sways R   -SC     Gait, Safety Issues  balance decreased during turns  -SC     Gait, Impairments  coordination impaired;impaired balance  -SC     Gait, Comment  cues requried to walk slower to keep her balance. Sways R at times  -SC     Recorded by  [SC] Frederick Hwang, PT     Therapy Exercises    Bilateral Lower Extremities  AROM:;10 reps;ankle pumps/circles;LAQ;hip flexion  -SC     Bilateral Upper Extremity  10 reps;AROM:;sitting;shoulder abduction/adduction;shoulder extension/flexion  -SC     Recorded by  [SC] Frederick Hwang PT     Positioning and Restraints    Post Treatment Position  chair  -SC     In Chair  sitting;call light within reach;encouraged to call for assist;with family/caregiver  -SC     Recorded by  [SC] Frederick Hwang PT       03/05/18 0900          Rehab Assessment/Intervention    Discipline occupational therapist  -AN      Document Type therapy note (daily note)  -AN      Subjective Information no complaints;agree to therapy  -AN      Patient Effort, Rehab Treatment good  -AN      Symptoms Noted During/After Treatment none  -AN      Precautions/Limitations fall precautions  -AN      Specific Treatment Considerations some dysmetria R hand  -AN      Recorded by [LOU] Aniya Harden OT      Pain Assessment    Pain Assessment No/denies pain  -AN      Recorded by [LOU] Aniya Harden OT      Cognitive Assessment/Intervention    Current Cognitive/Communication Assessment impaired   much improved speech, minimal dysarthria present  -AN      Orientation Status oriented x 4  -AN      Follows Commands/Answers Questions 100% of the time  -AN      Personal Safety WNL/WFL  -AN      Recorded by [LOU] Aniya Harden OT       Right Elbow/Forearm    Elbow Flexion Gross Movement (4+/5) good plus  -AN      Elbow Extension Gross Movement (4+/5) good plus  -AN      Recorded by [LOU] Aniya Harden OT      Bed Mobility, Assessment/Treatment    Bed Mobility, Comment pt up in chair  -AN      Recorded by [LOU] Aniya Harden OT      Transfer Assessment/Treatment    Transfers, Sit-Stand [place] supervision required  -AN      Transfers, Stand-Sit [place] supervision required  -AN      Transfers, Sit-Stand-Sit, Assist Device rolling walker  -AN      Bathtub Transfer, [place] minimum assist (75% patient effort)   simulated with grab bars  -AN      Transfer, Comment pt needs cues for keeping walker close to body during transitions  -AN      Recorded by [LOU] Aniya Harden OT      Functional Mobility    Functional Mobility- Ind. Level supervision required;verbal cues required  -AN      Functional Mobility- Device rolling walker  -AN      Functional Mobility-Distance (Feet) 20  -AN      Functional Mobility- Comment cues to keep RW with her with position changes and doing IADL' tasks  -AN      Recorded by [LOU] Aniya Harden OT      ADL Assessment/Intervention    Additional Documentation --    and carry trash can, relocate small objects with sup  -AN      Recorded by [LOU] Aniya Harden OT      Therapy Exercises    BUE Resistance theraputty;other (comment)   foam block ex  -AN      Recorded by [LOU] Aniya Harden OT      Gross Motor Coordination Training    Gross Motor Skill, Impairments Detail still impaired R finger to nose; some dysmetria with reaching for objects; performed slower speed reaching tasks with increased accuracy  -AN      Recorded by [LOU] Aniya Harden OT      Fine Motor Coordination Training    Opposition --   FM HEP reviewed  -AN      Recorded by [LOU] Aniya Harden OT      Positioning and Restraints    Pre-Treatment Position sitting in chair/recliner  -AN      Post Treatment Position chair  -AN      In Chair  reclined;call light within reach;encouraged to call for assist;with family/caregiver  -AN      Recorded by [AN] Aniya Harden, OT        User Key  (r) = Recorded By, (t) = Taken By, (c) = Cosigned By    Initials Name Effective Dates    SC Frederick Hwang, PT 06/19/15 -     CD Gracy Millan, PT 06/19/15 -     AN Aniya Harden, OT 06/22/15 -     LS Lizeth Jenkins, MS CCC-SLP 06/22/15 -     RD Jodi Lynne, MS CCC-SLP 09/27/17 -     SS Rex Enamorado, OT Student 12/11/17 -                 OT Goals       03/06/18 1451 03/05/18 0932 03/04/18 1010    Transfer Training OT LTG    Transfer Training OT LTG, Time to Achieve   5 days  -ST    Transfer Training OT LTG, Activity Type   toilet;tub  -ST    Transfer Training OT LTG, [place] Level   contact guard assist  -ST    Transfer Training OT LTG, Date Goal Reviewed (P)  --   Pt supervision with StoS transfer.  -SS      Transfer Training OT LTG, Outcome (P)  goal ongoing  -SS (P)  goal ongoing  -AN goal ongoing  -ST    Strength OT LTG    Strength Goal OT LTG, Time to Achieve   5 days  -ST    Strength Goal OT LTG, Functional Goal   Pt to be independent with BUE strengthening HEP by d/c to increase engagement and independence with daily tasks.  -ST    Strength Goal OT LTG, Outcome (P)  goal ongoing  -SS (P)  --   HEP introduced  -AN goal ongoing  -ST    Coordination OT LTG    Coordination OT LTG, Time to Achieve   5 days  -ST    Coordination OT LTG, Activity Type   GM written ex program  -ST    Coordination OT LTG, [place] Level   independent  -ST    Coordination OT LTG, Outcome (P)  goal ongoing  -SS (P)  goal ongoing   introduced HEP's this date  -AN goal ongoing  -ST      User Key  (r) = Recorded By, (t) = Taken By, (c) = Cosigned By    Initials Name Provider Type    ST Joelle Sharp, OTR Occupational Therapist    AN Aniya Harden, OT Occupational Therapist    SS Rex Enamorado, OT Student OT Student          Occupational Therapy Education      Title: PT OT SLP Therapies (Done)     Topic: Occupational Therapy (Done)     Point: ADL training (Done)    Description: Instruct learner(s) on proper safety adaptation and remediation techniques during self care or transfers.   Instruct in proper use of assistive devices.    Learning Progress Summary    Learner Readiness Method Response Comment Documented by Status   Patient Acceptance E VU Pt educated on body mechanics during transfers, AROM, GM/FM HEP to facilitate participation in ADL's.  03/06/18 1450 Done    Acceptance E,D,TB VU,NR,DU Reviewed txfrs, IADL practice with RW, safety in home and DME for home. AN 03/05/18 0931 Done    Acceptance E,TB,D VU,DU role of OT, benefits of activity, safety w/transfers, mobility, POC ST 03/04/18 1009 Done   Family Acceptance E,D,TB VU,NR,DU Reviewed txfrs, IADL practice with RW, safety in home and DME for home. AN 03/05/18 0931 Done               Point: Home exercise program (Done)    Description: Instruct learner(s) on appropriate technique for monitoring, assisting and/or progressing therapeutic exercises/activities.    Learning Progress Summary    Learner Readiness Method Response Comment Documented by Status   Patient Acceptance E VU Pt educated on body mechanics during transfers, AROM, GM/FM HEP to facilitate participation in ADL's.  03/06/18 1450 Done    Acceptance E,D,TB VU,NR,DU Reviewed txfrs, IADL practice with RW, safety in home and DME for home. AN 03/05/18 0931 Done    Acceptance E,TB,D VU,DU role of OT, benefits of activity, safety w/transfers, mobility, POC ST 03/04/18 1009 Done   Family Acceptance E,D,TB VU,NR,DU Reviewed txfrs, IADL practice with RW, safety in home and DME for home. AN 03/05/18 0931 Done               Point: Precautions (Done)    Description: Instruct learner(s) on prescribed precautions during self-care and functional transfers.    Learning Progress Summary    Learner Readiness Method Response Comment Documented by Status   Patient  Acceptance E,TB,D VU,DU role of OT, benefits of activity, safety w/transfers, mobility, POC  03/04/18 1009 Done               Point: Body mechanics (Done)    Description: Instruct learner(s) on proper positioning and spine alignment during self-care, functional mobility activities and/or exercises.    Learning Progress Summary    Learner Readiness Method Response Comment Documented by Status   Patient Acceptance E VU Pt educated on body mechanics during transfers, AROM, GM/FM HEP to facilitate participation in ADL's.  03/06/18 1450 Done    Acceptance E,D,TB VU,NR,DU Reviewed txfrs, IADL practice with RW, safety in home and DME for home.  03/05/18 0931 Done    Acceptance E,TB,D VU,DU role of OT, benefits of activity, safety w/transfers, mobility, POC  03/04/18 1009 Done   Family Acceptance E,D,TB VU,NR,DU Reviewed txfrs, IADL practice with RW, safety in home and DME for home.  03/05/18 0931 Done                      User Key     Initials Effective Dates Name Provider Type Discipline     02/20/17 -  Joelle Sharp, OTR Occupational Therapist OT     06/22/15 -  Aniya Harden, OT Occupational Therapist OT     12/11/17 -  Rex Enamorado, OT Student OT Student OT                  OT Recommendation and Plan  Anticipated Equipment Needs At Discharge: tub bench, raised toilet seat  Anticipated Discharge Disposition: inpatient rehabilitation facility, home with assist, home with outpatient services (if returns home will need 24/7 assist)  Planned Therapy Interventions: ADL retraining, IADL retraining, balance training, home exercise program, neuromuscular re-education, strengthening, transfer training  Therapy Frequency: daily  Plan of Care Review  Outcome Summary/Follow up Plan: (P) Pt supervision for StoS transfers, CGA for functional mobility and able to don socks with conditional I this session. Recommend IRF at discharge. Continue OT per POC.         Outcome Measures       03/06/18 1325 03/06/18  1238 03/05/18 1050    How much help from another person do you currently need...    Turning from your back to your side while in flat bed without using bedrails? 4  -CD  4  -SC    Moving from lying on back to sitting on the side of a flat bed without bedrails? 4  -CD  4  -SC    Moving to and from a bed to a chair (including a wheelchair)? 3  -CD  3  -SC    Standing up from a chair using your arms (e.g., wheelchair, bedside chair)? 3  -CD  3  -SC    Climbing 3-5 steps with a railing? 3  -CD  3  -SC    To walk in hospital room? 3  -CD  3  -SC    AM-PAC 6 Clicks Score 20  -CD  20  -SC    How much help from another is currently needed...    Putting on and taking off regular lower body clothing?  (P)  3  -SS     Bathing (including washing, rinsing, and drying)  (P)  3  -SS     Toileting (which includes using toilet bed pan or urinal)  (P)  3  -SS     Putting on and taking off regular upper body clothing  (P)  4  -SS     Taking care of personal grooming (such as brushing teeth)  (P)  4  -SS     Eating meals  (P)  4  -SS     Score  (P)  21  -SS     Modified Burt Scale    Modified Burt Scale 3 - Moderate disability.  Requiring some help, but able to walk without assistance.  -CD (P)  3 - Moderate disability.  Requiring some help, but able to walk without assistance.  -SS     Functional Assessment    Outcome Measure Options AM-PAC 6 Clicks Basic Mobility (PT);Modified Liz  -CD  -PAC 6 Clicks Basic Mobility (PT)  -SC      03/05/18 0901 03/04/18 1111 03/04/18 0904    How much help from another person do you currently need...    Turning from your back to your side while in flat bed without using bedrails?  4  -KM     Moving from lying on back to sitting on the side of a flat bed without bedrails?  4  -KM     Moving to and from a bed to a chair (including a wheelchair)?  3  -KM     Standing up from a chair using your arms (e.g., wheelchair, bedside chair)?  3  -KM     Climbing 3-5 steps with a railing?  3  -KM     To  walk in hospital room?  3  -KM     AM-PAC 6 Clicks Score  20  -KM     How much help from another is currently needed...    Putting on and taking off regular lower body clothing? 3  -AN  3  -ST    Bathing (including washing, rinsing, and drying) 3  -AN  3  -ST    Toileting (which includes using toilet bed pan or urinal) 3  -AN  3  -ST    Putting on and taking off regular upper body clothing 3  -AN  3  -ST    Taking care of personal grooming (such as brushing teeth) 3  -AN  3  -ST    Eating meals 4  -AN  4  -ST    Score 19  -AN  19  -ST    Modified Lyon Scale    Modified Liz Scale  3 - Moderate disability.  Requiring some help, but able to walk without assistance.  -KM 3 - Moderate disability.  Requiring some help, but able to walk without assistance.  -ST    Functional Assessment    Outcome Measure Options  AM-PAC 6 Clicks Basic Mobility (PT)  -KM AM-PAC 6 Clicks Daily Activity (OT);Modified Lyon  -ST      User Key  (r) = Recorded By, (t) = Taken By, (c) = Cosigned By    Initials Name Provider Type    SC Frederick Hwang, PT Physical Therapist    CD Gracy Millan, PT Physical Therapist     Joelle Sharp, OTR Occupational Therapist     Veronica Alvarado, PT Physical Therapist    AN Aniya Harden, OT Occupational Therapist     Rex Enamorado, OT Student OT Student           Time Calculation:         Time Calculation- OT       03/06/18 1608 03/06/18 1454       Time Calculation- OT    OT Start Time (P)  1238  -SS (P)  1238  -SS     Total Timed Code Minutes- OT (P)  13 minute(s)  - (P)  0 minute(s)  -     OT Received On (P)  03/06/18  -SS (P)  03/06/18  -SS     OT Goal Re-Cert Due Date (P)  03/14/18  -SS (P)  03/14/18  -SS       User Key  (r) = Recorded By, (t) = Taken By, (c) = Cosigned By    Initials Name Provider Type    KALINA Enamorado, OT Student OT Student           Therapy Charges for Today     Code Description Service Date Service Provider Modifiers Qty    [id number]  OT  THERAPEUTIC ACT EA 15 MIN 3/6/2018 Rex Enamorado, OT Student GO 1               Rex Enamorado, OT Student  3/6/2018

## 2018-08-29 NOTE — ED ADULT NURSE NOTE - NSIMPLEMENTINTERV_GEN_ALL_ED
Implemented All Universal Safety Interventions:  [place] to call system. Call bell, personal items and telephone within reach. Instruct patient to call for assistance. Room bathroom lighting operational. Non-slip footwear when patient is off stretcher. Physically safe environment: no spills, clutter or unnecessary equipment. Stretcher in lowest position, wheels locked, appropriate side rails in place.

## 2018-08-30 PROBLEM — E66.01 MORBID (SEVERE) OBESITY DUE TO EXCESS CALORIES: Chronic | Status: ACTIVE | Noted: 2018-07-12

## 2018-08-30 PROBLEM — E11.9 TYPE 2 DIABETES MELLITUS WITHOUT COMPLICATIONS: Chronic | Status: ACTIVE | Noted: 2018-07-12

## 2018-08-30 PROBLEM — I10 ESSENTIAL (PRIMARY) HYPERTENSION: Chronic | Status: ACTIVE | Noted: 2018-07-12

## 2018-08-30 PROBLEM — E78.00 PURE HYPERCHOLESTEROLEMIA, UNSPECIFIED: Chronic | Status: ACTIVE | Noted: 2018-07-12

## 2018-08-30 PROBLEM — K21.9 GASTRO-ESOPHAGEAL REFLUX DISEASE WITHOUT ESOPHAGITIS: Chronic | Status: ACTIVE | Noted: 2018-07-12

## 2018-08-30 PROBLEM — M10.9 GOUT, UNSPECIFIED: Chronic | Status: ACTIVE | Noted: 2018-07-12

## 2018-08-30 PROCEDURE — 73030 X-RAY EXAM OF SHOULDER: CPT | Mod: 26,RT

## 2018-08-30 PROCEDURE — 72125 CT NECK SPINE W/O DYE: CPT | Mod: 26

## 2018-08-30 NOTE — ED PROVIDER NOTE - NS ED ROS FT
· CONSTITUTIONAL: no fever and no chills.  · CARDIOVASCULAR: normal rate and rhythm, no chest pain and no edema.  · RESPIRATORY: no chest pain, no cough, and no shortness of breath.  · GASTROINTESTINAL: no abdominal pain, no bloating, no constipation, no diarrhea, no nausea and no vomiting.  · MUSCULOSKELETAL: no back pain, + musculoskeletal pain, no neck pain, and no weakness.  · SKIN: no abrasions, no jaundice, no lesions, no pruritis, and no rashes.  · NEURO: no loss of consciousness, no gait abnormality, no headache, no sensory deficits, and no weakness.  · PSYCHIATRIC: no known mental health issues.

## 2018-08-30 NOTE — ED PROVIDER NOTE - OBJECTIVE STATEMENT
38 year old male with h/o DM, GERD, HTN presents with shoulder injury x few hours. 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 now with right shoulder and right sided neck pain. no numbness/tingling. no head trauma. no LOC. no decreased ROM of shoulder  Denies head trauma, LOC, break in the skin, paresthesia, numbness, tingling, redness, bleeding, d/c, HA, dizziness, SOB, CP, palpitations, N/V, focal weakness, and malaise.

## 2018-08-30 NOTE — ED PROVIDER NOTE - PROGRESS NOTE DETAILS
delay in discharge due to radiology delay.  xray and CT unremarkable AFVSS at time of d/c, pt non-toxic appearing, results, ddx, and f/u plans discussed with pt at bedside, d/c'd home to f/u with PMD, strict return precautions discussed, prompt return to ER for any worsening or new sx, pt verbalized understanding.

## 2018-08-30 NOTE — ED PROVIDER NOTE - PHYSICAL EXAMINATION
VITAL SIGNS: I have reviewed nursing notes and confirm.  CONSTITUTIONAL: Well-developed; well-nourished; in no acute distress.  SKIN: Skin is warm and dry, no acute rash.  HEAD: Normocephalic; atraumatic.  EYES: PERRL, EOM intact; conjunctiva and sclera clear.  ENT: No nasal discharge; airway clear.  NECK: Supple; non tender midline.  + right paracervical tenderness   CARD: S1, S2 normal; no murmurs, gallops, or rubs. Regular rate and rhythm.  RESP: No wheezes, rales or rhonchi.  ABD: Normal bowel sounds; soft; non-distended; non-tender;  no palpable or pulsating mass; no hepatosplenomegaly.  EXT: RIGHT SHOULDER:  no swelling, nontender, ROM limited due to pain, Distal sensation and pulses normal, clavicle, nontender  OTHER: Normal ROM. No clubbing, cyanosis or edema.  NEURO: Alert, oriented. Grossly unremarkable.  PSYCH: Cooperative, appropriate.

## 2018-12-14 ENCOUNTER — APPOINTMENT (OUTPATIENT)
Dept: ORTHOPEDIC SURGERY | Facility: CLINIC | Age: 38
End: 2018-12-14
Payer: OTHER MISCELLANEOUS

## 2018-12-14 VITALS
HEIGHT: 73 IN | HEART RATE: 56 BPM | BODY MASS INDEX: 38.17 KG/M2 | DIASTOLIC BLOOD PRESSURE: 72 MMHG | SYSTOLIC BLOOD PRESSURE: 118 MMHG | WEIGHT: 288 LBS

## 2018-12-14 DIAGNOSIS — Z78.9 OTHER SPECIFIED HEALTH STATUS: ICD-10-CM

## 2018-12-14 DIAGNOSIS — Z86.39 PERSONAL HISTORY OF OTHER ENDOCRINE, NUTRITIONAL AND METABOLIC DISEASE: ICD-10-CM

## 2018-12-14 DIAGNOSIS — S43.431A SUPERIOR GLENOID LABRUM LESION OF RIGHT SHOULDER, INITIAL ENCOUNTER: ICD-10-CM

## 2018-12-14 DIAGNOSIS — S43.439A SUPERIOR GLENOID LABRUM LESION OF UNSPECIFIED SHOULDER, INITIAL ENCOUNTER: ICD-10-CM

## 2018-12-14 PROCEDURE — 99243 OFF/OP CNSLTJ NEW/EST LOW 30: CPT

## 2018-12-20 PROBLEM — Z78.9 EXERCISES OCCASIONALLY: Status: ACTIVE | Noted: 2018-12-14

## 2018-12-20 PROBLEM — Z78.9 CONSUMES ALCOHOL OCCASIONALLY: Status: ACTIVE | Noted: 2018-12-14

## 2018-12-20 PROBLEM — S43.431A TEAR OF RIGHT GLENOID LABRUM, INITIAL ENCOUNTER: Status: ACTIVE | Noted: 2018-12-20

## 2018-12-20 PROBLEM — Z86.39 HISTORY OF HIGH CHOLESTEROL: Status: RESOLVED | Noted: 2018-12-14 | Resolved: 2018-12-20

## 2018-12-20 PROBLEM — Z78.9 DOES NOT USE ILLICIT DRUGS: Status: ACTIVE | Noted: 2018-12-14

## 2018-12-20 RX ORDER — ALLOPURINOL 200 MG/1
TABLET ORAL
Refills: 0 | Status: ACTIVE | COMMUNITY

## 2018-12-20 RX ORDER — ROSUVASTATIN CALCIUM 5 MG/1
TABLET, FILM COATED ORAL
Refills: 0 | Status: ACTIVE | COMMUNITY

## 2019-03-22 ENCOUNTER — APPOINTMENT (OUTPATIENT)
Dept: ORTHOPEDIC SURGERY | Facility: CLINIC | Age: 39
End: 2019-03-22

## 2020-03-31 ENCOUNTER — TRANSCRIPTION ENCOUNTER (OUTPATIENT)
Age: 40
End: 2020-03-31

## 2020-09-24 NOTE — ED ADULT NURSE NOTE - TEMPLATE LIST FOR HEAD TO TOE ASSESSMENT
Form was faxed back to [place] [phone] regarding pt's surgery clinic notes labs ekg, telephone number [phone]  
Orthopedic

## 2021-01-03 NOTE — DISCHARGE NOTE ADULT - HOSPITAL COURSE
Status post laparoscopic sleeve gastrectomy, upper G.I. series negative, tolerating clears, DC home
Warm

## 2021-04-06 NOTE — H&P PST ADULT - PRIMARY CARE PROVIDER
Hpi Title: Evaluation of Skin Lesions
Additional History: patient has used 5 fu on areas last use of topical was approximately 5 wks ago
Year Removed: 1900
Dr. Bell [phone]

## 2021-10-13 ENCOUNTER — OUTPATIENT (OUTPATIENT)
Dept: OUTPATIENT SERVICES | Facility: HOSPITAL | Age: 41
LOS: 1 days | End: 2021-10-13
Payer: COMMERCIAL

## 2021-10-13 DIAGNOSIS — Z11.52 ENCOUNTER FOR SCREENING FOR COVID-19: ICD-10-CM

## 2021-10-13 DIAGNOSIS — Z98.890 OTHER SPECIFIED POSTPROCEDURAL STATES: Chronic | ICD-10-CM

## 2021-10-13 PROCEDURE — U0003: CPT

## 2021-10-13 PROCEDURE — C9803: CPT

## 2021-10-13 PROCEDURE — U0005: CPT

## 2021-10-14 LAB — SARS-COV-2 RNA SPEC QL NAA+PROBE: SIGNIFICANT CHANGE UP

## 2021-12-24 ENCOUNTER — OUTPATIENT (OUTPATIENT)
Dept: OUTPATIENT SERVICES | Facility: HOSPITAL | Age: 41
LOS: 1 days | End: 2021-12-24
Payer: COMMERCIAL

## 2021-12-24 DIAGNOSIS — Z98.890 OTHER SPECIFIED POSTPROCEDURAL STATES: Chronic | ICD-10-CM

## 2021-12-24 DIAGNOSIS — Z20.828 CONTACT WITH AND (SUSPECTED) EXPOSURE TO OTHER VIRAL COMMUNICABLE DISEASES: ICD-10-CM

## 2021-12-24 LAB — SARS-COV-2 RNA SPEC QL NAA+PROBE: SIGNIFICANT CHANGE UP

## 2021-12-24 PROCEDURE — U0005: CPT

## 2021-12-24 PROCEDURE — U0003: CPT

## 2021-12-24 PROCEDURE — C9803: CPT

## 2021-12-25 DIAGNOSIS — Z20.828 CONTACT WITH AND (SUSPECTED) EXPOSURE TO OTHER VIRAL COMMUNICABLE DISEASES: ICD-10-CM

## 2022-01-12 ENCOUNTER — OUTPATIENT (OUTPATIENT)
Dept: OUTPATIENT SERVICES | Facility: HOSPITAL | Age: 42
LOS: 1 days | End: 2022-01-12
Payer: COMMERCIAL

## 2022-01-12 DIAGNOSIS — Z20.828 CONTACT WITH AND (SUSPECTED) EXPOSURE TO OTHER VIRAL COMMUNICABLE DISEASES: ICD-10-CM

## 2022-01-12 DIAGNOSIS — Z98.890 OTHER SPECIFIED POSTPROCEDURAL STATES: Chronic | ICD-10-CM

## 2022-01-12 LAB — SARS-COV-2 RNA SPEC QL NAA+PROBE: SIGNIFICANT CHANGE UP

## 2022-01-12 PROCEDURE — U0003: CPT

## 2022-01-12 PROCEDURE — U0005: CPT

## 2022-01-12 PROCEDURE — C9803: CPT

## 2022-01-13 DIAGNOSIS — Z20.828 CONTACT WITH AND (SUSPECTED) EXPOSURE TO OTHER VIRAL COMMUNICABLE DISEASES: ICD-10-CM

## 2023-07-09 ENCOUNTER — NON-APPOINTMENT (OUTPATIENT)
Age: 43
End: 2023-07-09

## 2024-07-13 ENCOUNTER — NON-APPOINTMENT (OUTPATIENT)
Age: 44
End: 2024-07-13

## 2024-12-09 ENCOUNTER — NON-APPOINTMENT (OUTPATIENT)
Age: 44
End: 2024-12-09

## 2025-04-16 ENCOUNTER — TRANSCRIPTION ENCOUNTER (OUTPATIENT)
Age: 45
End: 2025-04-16

## 2025-04-16 ENCOUNTER — APPOINTMENT (OUTPATIENT)
Dept: FAMILY MEDICINE | Facility: CLINIC | Age: 45
End: 2025-04-16
Payer: COMMERCIAL

## 2025-04-16 VITALS
HEIGHT: 73 IN | SYSTOLIC BLOOD PRESSURE: 140 MMHG | DIASTOLIC BLOOD PRESSURE: 96 MMHG | TEMPERATURE: 97.6 F | OXYGEN SATURATION: 98 % | HEART RATE: 81 BPM | BODY MASS INDEX: 39.76 KG/M2 | WEIGHT: 300 LBS

## 2025-04-16 DIAGNOSIS — Z82.3 FAMILY HISTORY OF STROKE: ICD-10-CM

## 2025-04-16 DIAGNOSIS — Z86.39 PERSONAL HISTORY OF OTHER ENDOCRINE, NUTRITIONAL AND METABOLIC DISEASE: ICD-10-CM

## 2025-04-16 DIAGNOSIS — M10.9 GOUT, UNSPECIFIED: ICD-10-CM

## 2025-04-16 DIAGNOSIS — R73.03 PREDIABETES.: ICD-10-CM

## 2025-04-16 DIAGNOSIS — Z83.3 FAMILY HISTORY OF DIABETES MELLITUS: ICD-10-CM

## 2025-04-16 DIAGNOSIS — Z82.49 FAMILY HISTORY OF ISCHEMIC HEART DISEASE AND OTHER DISEASES OF THE CIRCULATORY SYSTEM: ICD-10-CM

## 2025-04-16 DIAGNOSIS — Z86.79 PERSONAL HISTORY OF OTHER DISEASES OF THE CIRCULATORY SYSTEM: ICD-10-CM

## 2025-04-16 DIAGNOSIS — Z00.00 ENCOUNTER FOR GENERAL ADULT MEDICAL EXAMINATION W/OUT ABNORMAL FINDINGS: ICD-10-CM

## 2025-04-16 DIAGNOSIS — R03.0 ELEVATED BLOOD-PRESSURE READING, W/OUT DIAGNOSIS OF HYPERTENSION: ICD-10-CM

## 2025-04-16 PROCEDURE — 99204 OFFICE O/P NEW MOD 45 MIN: CPT | Mod: 25

## 2025-04-16 PROCEDURE — 99386 PREV VISIT NEW AGE 40-64: CPT

## 2025-04-16 PROCEDURE — 99401 PREV MED CNSL INDIV APPRX 15: CPT | Mod: 25

## 2025-04-16 RX ORDER — LOSARTAN POTASSIUM 100 MG/1
100 TABLET, FILM COATED ORAL DAILY
Qty: 90 | Refills: 1 | Status: ACTIVE | COMMUNITY
Start: 2025-04-16 | End: 1900-01-01

## 2025-04-17 ENCOUNTER — LABORATORY RESULT (OUTPATIENT)
Age: 45
End: 2025-04-17

## 2025-04-18 LAB
ALBUMIN SERPL ELPH-MCNC: 4.5 G/DL
ALP BLD-CCNC: 72 U/L
ALT SERPL-CCNC: 28 U/L
ANION GAP SERPL CALC-SCNC: 11 MMOL/L
APPEARANCE: CLEAR
AST SERPL-CCNC: 18 U/L
BASOPHILS # BLD AUTO: 0.06 K/UL
BASOPHILS NFR BLD AUTO: 0.9 %
BILIRUB SERPL-MCNC: 0.5 MG/DL
BILIRUBIN URINE: NEGATIVE
BLOOD URINE: NEGATIVE
BUN SERPL-MCNC: 11 MG/DL
CALCIUM SERPL-MCNC: 9.1 MG/DL
CHLORIDE SERPL-SCNC: 105 MMOL/L
CHOLEST SERPL-MCNC: 222 MG/DL
CO2 SERPL-SCNC: 24 MMOL/L
COLOR: YELLOW
CREAT SERPL-MCNC: 0.87 MG/DL
EGFRCR SERPLBLD CKD-EPI 2021: 110 ML/MIN/1.73M2
EOSINOPHIL # BLD AUTO: 0.2 K/UL
EOSINOPHIL NFR BLD AUTO: 2.9 %
ESTIMATED AVERAGE GLUCOSE: 134 MG/DL
GLUCOSE QUALITATIVE U: NEGATIVE MG/DL
GLUCOSE SERPL-MCNC: 107 MG/DL
HBA1C MFR BLD HPLC: 6.3 %
HCT VFR BLD CALC: 45.4 %
HCV AB SER QL: NONREACTIVE
HCV S/CO RATIO: 0.14 S/CO
HDLC SERPL-MCNC: 62 MG/DL
HGB BLD-MCNC: 15.2 G/DL
IMM GRANULOCYTES NFR BLD AUTO: 0.3 %
KETONES URINE: NEGATIVE MG/DL
LDLC SERPL-MCNC: 141 MG/DL
LEUKOCYTE ESTERASE URINE: ABNORMAL
LYMPHOCYTES # BLD AUTO: 2.8 K/UL
LYMPHOCYTES NFR BLD AUTO: 40.6 %
MAN DIFF?: NORMAL
MCHC RBC-ENTMCNC: 30.6 PG
MCHC RBC-ENTMCNC: 33.5 G/DL
MCV RBC AUTO: 91.3 FL
MONOCYTES # BLD AUTO: 0.54 K/UL
MONOCYTES NFR BLD AUTO: 7.8 %
NEUTROPHILS # BLD AUTO: 3.27 K/UL
NEUTROPHILS NFR BLD AUTO: 47.5 %
NITRITE URINE: NEGATIVE
NONHDLC SERPL-MCNC: 159 MG/DL
PH URINE: 5.5
PLATELET # BLD AUTO: 222 K/UL
POTASSIUM SERPL-SCNC: 4.4 MMOL/L
PROT SERPL-MCNC: 7.2 G/DL
PROTEIN URINE: NEGATIVE MG/DL
RBC # BLD: 4.97 M/UL
RBC # FLD: 12.3 %
SODIUM SERPL-SCNC: 139 MMOL/L
SPECIFIC GRAVITY URINE: 1.02
TRIGL SERPL-MCNC: 103 MG/DL
URATE SERPL-MCNC: 6.4 MG/DL
UROBILINOGEN URINE: 0.2 MG/DL
WBC # FLD AUTO: 6.89 K/UL

## 2025-04-18 RX ORDER — ALLOPURINOL 100 MG/1
100 TABLET ORAL DAILY
Qty: 180 | Refills: 1 | Status: ACTIVE | COMMUNITY
Start: 2025-04-16 | End: 1900-01-01

## 2025-04-30 ENCOUNTER — APPOINTMENT (OUTPATIENT)
Dept: FAMILY MEDICINE | Facility: CLINIC | Age: 45
End: 2025-04-30

## 2025-07-30 ENCOUNTER — APPOINTMENT (OUTPATIENT)
Dept: FAMILY MEDICINE | Facility: CLINIC | Age: 45
End: 2025-07-30